# Patient Record
Sex: FEMALE | Race: WHITE | Employment: STUDENT | URBAN - METROPOLITAN AREA
[De-identification: names, ages, dates, MRNs, and addresses within clinical notes are randomized per-mention and may not be internally consistent; named-entity substitution may affect disease eponyms.]

---
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
## 2024-02-09 ENCOUNTER — HOSPITAL ENCOUNTER (OUTPATIENT)
Age: 19
Discharge: HOME | End: 2024-02-09
Payer: COMMERCIAL

## 2024-02-09 VITALS
OXYGEN SATURATION: 99 % | SYSTOLIC BLOOD PRESSURE: 135 MMHG | HEART RATE: 56 BPM | DIASTOLIC BLOOD PRESSURE: 77 MMHG | RESPIRATION RATE: 16 BRPM | TEMPERATURE: 98 F

## 2024-02-09 DIAGNOSIS — J03.90 EXUDATIVE TONSILLITIS: Primary | ICD-10-CM

## 2024-02-09 LAB — S PYO AG THROAT QL: NEGATIVE

## 2024-02-09 PROCEDURE — 99213 OFFICE O/P EST LOW 20 MIN: CPT | Performed by: PHYSICIAN ASSISTANT

## 2024-02-09 PROCEDURE — 87880 STREP A ASSAY W/OPTIC: CPT | Performed by: PHYSICIAN ASSISTANT

## 2024-02-09 RX ORDER — AMOXICILLIN 500 MG/1
500 TABLET, FILM COATED ORAL 2 TIMES DAILY
Qty: 20 TABLET | Refills: 0 | Status: SHIPPED | OUTPATIENT
Start: 2024-02-09 | End: 2024-02-19

## 2024-02-09 NOTE — DISCHARGE INSTRUCTIONS
Complete entire course of antibiotic as directed   Alternate Tylenol/Motrin every 3 hours as needed for pain or fever > 100.4   Drink plenty of fluids including warm tea with honey/lemon   Get plenty of rest     You may benefit from using a humidifier  Avoid having air blow on your face    Wash hands often  Disinfect your environment  Do not share utensils or drinks    Follow up with your primary care provider    If you experience severe/worsening pain, difficulty swallowing or breathing, facial or neck swelling, drooling, change in voice, or any other concerning symptoms, go to nearest ER immediately

## 2024-02-09 NOTE — ED PROVIDER NOTES
Chief Complaint   Patient presents with    Sore Throat       History obtained from: patient   services not used    HPI:     Shahrzad Aponte is a 19 year old female who presents with sore throat x 3-4 days. Patient also notes soreness and swelling in lymph nodes. Patient complains of pain with swallowing but continues to tolerate oral intake.  Patient taking Tylenol for pain.  Patient has a history of strep throat and states symptoms feel similar.  Patient states she presented to  in November with sore throat and had negative rapid strep test but symptoms worsened and she returned and was treated with antibiotics which resolved symptoms.  Patient then followed up with ENT who agreed with likely bacterial tonsillitis.  No recent antibiotics.  Denies fever, facial or neck swelling, drooling, voice change, cough, runny nose, ear pain.    PMH  No past medical history on file.    [facility] and [facility].  Nurses notes reviewed I agree with documentation.    Family History   Problem Relation Age of Onset    Diabetes Mother     Diabetes Maternal Grandfather      Family history reviewed with patient/caregiver and is not pertinent to presenting problem.  Social History     Socioeconomic History    Marital status: Single     Spouse name: Not on file    Number of children: Not on file    Years of education: Not on file    Highest education level: Not on file   Occupational History    Not on file   Tobacco Use    Smoking status: Never    Smokeless tobacco: Never   Substance and Sexual Activity    Alcohol use: No     Alcohol/week: 0.0 standard drinks of alcohol    Drug use: No    Sexual activity: Not on file   Other Topics Concern    Not on file   Social History Narrative    Not on file     Social Determinants of Health     Financial Resource Strain: Not on file   Food Insecurity: Not on file   Transportation Needs: Not on file   Physical Activity: Not on file   Stress: Not on file   Social  Connections: Not on file   Housing Stability: Not on file         ROS:   Positive for stated complaint: Sore throat  All other systems reviewed and negative except as noted above.  Constitutional and Vital Signs Reviewed.    Physical Exam:     Findings:    /77   Pulse 56   Temp 97.7 °F (36.5 °C) (Temporal)   Resp 16   LMP 02/09/2024 (Exact Date)   SpO2 99%   GENERAL: well developed, no acute distress, non-toxic appearing   SKIN: good skin turgor, no obvious rashes  HEAD: normocephalic, atraumatic  EYES: sclera non-icteric bilaterally, conjunctiva clear  EARS: TMs clear bilaterally, canals clear  NOSE: nasal turbinates: pink, normal mucosa  OROPHARYNX: MMM, pharynx erythematous, tonsils enlarged 2+ equal and symmetric bilaterally with exudate, uvula midline, no tongue elevation, maintaining airway and secretions  NECK: supple, bilateral superficial cervical lymphadenopathy, no nuchal rigidity, no trismus, no edema, phonation normal    CARDIO: RRR, normal heart sounds   LUNGS: clear to auscultation bilaterally, no increased WOB, no rales, rhonchi, or wheezes  EXTREMITIES: no cyanosis or edema, CLAY without difficulty  NEURO: no focal deficits  PSYCH: alert and oriented x3.  Answering questions appropriately.  Mood appropriate.    MDM/Assessment/Plan:   Orders for this encounter:    Orders Placed This Encounter    POCT Rapid Strep    amoxicillin 500 MG Oral Tab     Sig: Take 1 tablet (500 mg total) by mouth 2 (two) times daily for 10 days.     Dispense:  20 tablet     Refill:  0       Labs performed this visit:  Recent Results (from the past 10 hour(s))   POCT Rapid Strep    Collection Time: 02/09/24 10:09 AM   Result Value Ref Range    POCT Rapid Strep Negative Negative       Imaging performed this visit:  No orders to display       MDM:  DDx includes strep pharyngitis versus tonsillitis versus viral pharyngitis versus other.  Patient is overall very well-appearing with stable vitals and tolerating oral  intake.  No signs of PTA.  Rapid strep test negative.  Given physical exam findings and patient's history of recurrent strep throat, shared decision making employed to treat exudative tonsillitis with antibiotics at this time.  Patient agreeable.  Rx amoxicillin x 10 days.  Discussed supportive care including rest, increase fluid intake, OTC Tylenol/Motrin as needed for pain or fevers.  Instructed patient to go directly to nearest ER with any worsening or concerning symptoms.  Follow-up with ENT.    Diagnosis:    ICD-10-CM    1. Exudative tonsillitis  J03.90           All results reviewed and discussed with patient/patient's family. Patient/patient's family verbalize understanding of instructions. All of patient's/patient's family's questions were addressed.   See AVS for detailed discharge instructions for your condition today.    Follow Up with:  Refugio Silverman MD  [address] [place] [id number]  [phone]      ENT referral         Eliza Chappell PA-C

## 2024-10-03 ENCOUNTER — OFFICE VISIT (OUTPATIENT)
Dept: OCCUPATIONAL MEDICINE | Age: 19
End: 2024-10-03
Attending: NURSE PRACTITIONER

## 2024-10-03 DIAGNOSIS — Z00.00 ROUTINE GENERAL MEDICAL EXAMINATION AT A HEALTH CARE FACILITY: Primary | ICD-10-CM

## 2024-10-03 LAB
HBV SURFACE AB SER QL: NONREACTIVE
HBV SURFACE AB SERPL IA-ACNC: <3.1 MIU/ML
RUBV IGG SER QL: POSITIVE
RUBV IGG SER-ACNC: 25 IU/ML (ref 10–?)

## 2024-10-03 PROCEDURE — 86765 RUBEOLA ANTIBODY: CPT

## 2024-10-03 PROCEDURE — 86480 TB TEST CELL IMMUN MEASURE: CPT

## 2024-10-03 PROCEDURE — 86762 RUBELLA ANTIBODY: CPT

## 2024-10-03 PROCEDURE — 86787 VARICELLA-ZOSTER ANTIBODY: CPT

## 2024-10-03 PROCEDURE — 86706 HEP B SURFACE ANTIBODY: CPT

## 2024-10-03 PROCEDURE — 86735 MUMPS ANTIBODY: CPT

## 2024-10-04 LAB
MEV IGG SER-ACNC: <5 AU/ML (ref 16.5–?)
MUV IGG SER IA-ACNC: <5 AU/ML (ref 11–?)
VZV IGG SER IA-ACNC: 76.13 (ref 165–?)

## 2024-10-05 LAB
M TB IFN-G CD4+ T-CELLS BLD-ACNC: 0.02 IU/ML
M TB TUBERC IFN-G BLD QL: NEGATIVE
M TB TUBERC IGNF/MITOGEN IGNF CONTROL: >10 IU/ML
QFT TB1 AG MINUS NIL: 0 IU/ML
QFT TB2 AG MINUS NIL: 0 IU/ML

## (undated) NOTE — LETTER
VACCINE ADMINISTRATION RECORD  PARENT / GUARDIAN APPROVAL  Date: 2018  Vaccine administered to:  Gena eCballos     : 2005    MRN: [id number]    A copy of the appropriate Centers for Disease Control and Prevention Vaccine Information statement has

## (undated) NOTE — LETTER
[facility] SafetyCertified of [employer] of Child Health Examination       Student's Name  [facility] Birth Date verifying above immunization history must sign below.   Signature                                                                                                                                   Title                           Date  7/11/2018   Signature Student's Name  [address] [id number] Birth Date  2/6/2005  Sex  Female School   Grade Level/ID#  9th Grade   HEALTH HISTORY          TO BE COMPLETED AND SIGNED BY PARENT/GUARDIAN AND VERIFIED BY HEALTH CARE PROVIDER    ALLERGIES  (Food, drug, insect, other)  Samy by MD/DO/APN/PA       PHYSICAL EXAMINATION REQUIREMENTS (head circumference if <33 years old):   /79   Pulse 83   Ht 5' 3\" (1.6 m)   Wt 64.3 kg (141 lb 11.2 oz)   BMI 25.10 kg/m²     DIABETES SCREENING  BMI>85% age/sex  No And any two of the follow Cardiovascular/HTN Yes  Nutritional status Yes    Respiratory Yes                   Diagnosis of Asthma: No Mental Health Yes        Currently Prescribed Asthma Medication:            Quick-relief  medication (e.g. Short Acting Beta Antagonist):  No

## (undated) NOTE — LETTER
Date & Time: 5/3/2022, 9:40 AM  Patient: Raymundo Cody  Encounter Provider(s):    MARIANA Matias       To Whom It May Concern:    Raymundo Cody was seen and treated in our department on 5/3/2022. She can return to school 5/4/2022.     If you have any questions or concerns, please do not hesitate to call.        _____________________________  Physician/APC Signature